# Patient Record
Sex: MALE | Race: WHITE
[De-identification: names, ages, dates, MRNs, and addresses within clinical notes are randomized per-mention and may not be internally consistent; named-entity substitution may affect disease eponyms.]

---

## 2019-11-26 ENCOUNTER — HOSPITAL ENCOUNTER (EMERGENCY)
Dept: HOSPITAL 92 - ERS | Age: 39
Discharge: HOME | End: 2019-11-26
Payer: SELF-PAY

## 2019-11-26 DIAGNOSIS — R33.9: Primary | ICD-10-CM

## 2019-11-26 DIAGNOSIS — F17.210: ICD-10-CM

## 2019-11-26 PROCEDURE — 99283 EMERGENCY DEPT VISIT LOW MDM: CPT

## 2020-10-28 ENCOUNTER — HOSPITAL ENCOUNTER (EMERGENCY)
Dept: HOSPITAL 92 - ERS | Age: 40
Discharge: HOME | End: 2020-10-28
Payer: SELF-PAY

## 2020-10-28 DIAGNOSIS — N45.1: Primary | ICD-10-CM

## 2020-10-28 DIAGNOSIS — F17.210: ICD-10-CM

## 2020-10-28 DIAGNOSIS — I86.1: ICD-10-CM

## 2020-10-28 LAB
BACTERIA UR QL AUTO: (no result) HPF
LEUKOCYTE ESTERASE UR QL STRIP.AUTO: 75 LEU/UL
PROT UR STRIP.AUTO-MCNC: 30 MG/DL
RBC UR QL AUTO: (no result) HPF (ref 0–3)
SP GR UR STRIP: 1.03 (ref 1–1.04)

## 2020-10-28 PROCEDURE — 87077 CULTURE AEROBIC IDENTIFY: CPT

## 2020-10-28 PROCEDURE — 81015 MICROSCOPIC EXAM OF URINE: CPT

## 2020-10-28 PROCEDURE — 87086 URINE CULTURE/COLONY COUNT: CPT

## 2020-10-28 PROCEDURE — 93976 VASCULAR STUDY: CPT

## 2020-10-28 PROCEDURE — 76870 US EXAM SCROTUM: CPT

## 2020-10-28 PROCEDURE — 87186 SC STD MICRODIL/AGAR DIL: CPT

## 2020-10-28 PROCEDURE — 81003 URINALYSIS AUTO W/O SCOPE: CPT

## 2020-10-28 PROCEDURE — 96372 THER/PROPH/DIAG INJ SC/IM: CPT

## 2020-10-28 NOTE — ULT
SCROTAL ULTRASOUND:

10/28/20

 

INDICATIONS:

Left testicular pain and swelling.

 

Both testicles have normal and symmetric size and appearance. Color Doppler and spectral analysis dem
onstrates blood flow to both testicles. 

 

There are small bilateral hydroceles. 

 

There is abnormal echogenicity inferior and lateral to the left testicle with increased blood flow. T
his suggests an enlarged epididymis with increased blood flow suggesting inflammation or infection. 

 

The left hydrocele shows septations indicating a complex hydrocele. 

 

IMPRESSION: 

Enlarged echogenic epididymis on the left with increased vascularity suggesting epididymitis. There a
re bilateral hydroceles with a complex hydrocele on the left. 

 

POS: [facility]